# Patient Record
Sex: FEMALE | Race: WHITE | NOT HISPANIC OR LATINO | URBAN - METROPOLITAN AREA
[De-identification: names, ages, dates, MRNs, and addresses within clinical notes are randomized per-mention and may not be internally consistent; named-entity substitution may affect disease eponyms.]

---

## 2017-04-12 ENCOUNTER — IMPORTED ENCOUNTER (OUTPATIENT)
Dept: URBAN - METROPOLITAN AREA CLINIC 50 | Facility: CLINIC | Age: 82
End: 2017-04-12

## 2017-12-06 NOTE — PATIENT DISCUSSION
Use artificial tears to affected eye(s) as needed.  Rec try switching to pres. Free tears and can also try celluvisc.

## 2017-12-06 NOTE — PATIENT DISCUSSION
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 Macular pucker is usually due to previous posterior vitreous detachment but can also be due to uveitis, retinal vascular occlusion, retinal tear, retinal detachment, and idiopathic. Most patients with epiretinal membranes with macular pucker do not progress to the point where intervention is needed. Intervention is typically surgical. The patient can be observed carefully with retinal follow-up in a number of months. If the maculopathy progresses, surgery will be considered. 85% say the same.  Will watch closely.

## 2018-01-02 NOTE — PATIENT DISCUSSION
Discussed benefits, alternatives, realistic expectations and risks of surgery including (but not limited to) cataract, change in refraction, infection, bleeding, retinal detachment, optic neuropathy, loss of vision, blindness, and loss of eye.

## 2018-01-02 NOTE — PATIENT DISCUSSION
RD is present. Options from observation, surgery. pneumatic and second opinion discussed. Risks including not limited to infection, bleeding, glaucoma, cataract, IOL damage, PVR, retinal scars, multiple surgeries, chronic pain, cosmetic changes, decreased ability to perform daily activities causing personal/professional damage, loss of vision/eye discussed. Recommended surgery to slow the progression of RD and possibly resolve RD. The possibility of multiple surgeries emphasized.  Risks of infection, bleeding, tear, detachment, ocular irritations and cosmetic changes among others discussed. Thorough hygiene and antibiotic use discussed. Call for any changes. Required head positioned discussed.

## 2018-01-02 NOTE — PATIENT DISCUSSION
Patient understands condition, prognosis and need for follow up care.  No flying wth bubble.  Addis today 23G PPV/EL/PFO/AFG x change OD at [facility] today.

## 2018-01-03 NOTE — PATIENT DISCUSSION
Patient informed the pain was possibly due to the anesthesia wearing off last night.  Patient advised to call JTM's cell if she has anymore pain.

## 2018-01-09 NOTE — PATIENT DISCUSSION
Continue Vig QID until gone, PA QID, discontinue Atropine Sulfate, tobradex ointment as needed for pain from stitches.

## 2018-01-16 NOTE — PATIENT DISCUSSION
Taper schedule PA TID 1 week, BID 1 week, QDAY 1 week, then d/c (given another Rx with 1 refill) Tobradex or Erythromicin ointment, sample given as needed for pain from stitches.

## 2018-01-30 NOTE — PATIENT DISCUSSION
Resume normal positioning. NO FLAT ON BACK.  Nothing still real strenuous.  No lifting over 20lbs but light exercise okay.

## 2018-01-30 NOTE — PATIENT DISCUSSION
Poor vision with bubble in, this can cause HA's and depth perception to be off.  Discussed bubble at length, retina still healing and will take time to reach max vision potential.

## 2018-02-16 NOTE — PATIENT DISCUSSION
Good PO Progress, headache could be caused by gas bubble moving around, not because of eye pressure, which is within normal limits. Also reflections off gas bubble could be causing disturbance. Depth perception will still be off. Any position ok for now. Still no heavy lifting over 20lbs. Vision will never be what it was prior to detachment due to center vision area damaged from detachment. Advised pt to call with any s/s of RT/RD. Do not recc any exercise that jars the head.

## 2018-03-06 NOTE — PATIENT DISCUSSION
Recommended VITRECTOMY, endolaser, FLUID/GAS EXCHANGE. Discussed benefits, alternatives, and risks of surgery including (but not limited to) glaucoma, infection, bleeding, re-detachment, optic neuropathy, loss of vision, blindness, and loss of eye. Patient understands this surgery is to repair new retinal tear with detachment.

## 2018-03-08 NOTE — PATIENT DISCUSSION
Bubble will be at maximum size after 2 days, recommend wait for the continuation of laser until tomorrow afternoon.

## 2018-03-09 NOTE — PROCEDURE NOTE: CLINICAL
PROCEDURE NOTE: Repair of Retinal Detachment, 1 or More Sessions #1 OD. Diagnosis: Recurrent RD. Prior to laser, risks/benefits/alternatives to laser discussed including loss of vision, decreased peripheral and night vision, need for more laser and/or surgery and patient wished to proceed. A written consent is on file, and the need for today’s laser was discussed and the patient is understanding and wishes to proceed. Laser Lens: *. Wavelength: Argon Green. Spot size: * um. Pulse power: * mW. Number of pulses: *. Patient tolerated procedure well. There were no complications. Post-op instructions given. Patient given office phone number/answering service number and advised to call immediately should there be loss of vision or pain, or should they have any other questions or concerns. Rafael Olivares

## 2018-03-13 NOTE — PROCEDURE NOTE: CLINICAL
PROCEDURE NOTE: Repair of Retinal Detachment, 1 or More Sessions #2 OD. Diagnosis: Recurrent RD. Prior to laser, risks/benefits/alternatives to laser discussed including loss of vision, decreased peripheral and night vision, need for more laser and/or surgery and patient wished to proceed. A written consent is on file, and the need for today’s laser was discussed and the patient is understanding and wishes to proceed. Laser Lens: 3 mirror. Wavelength: Argon Green. Spot size: 200 um. Pulse power: 170 mW. Number of pulses: 530. Patient tolerated procedure well. There were no complications. Post-op instructions given. Patient given office phone number/answering service number and advised to call immediately should there be loss of vision or pain, or should they have any other questions or concerns. Chapo Oviedo

## 2018-03-23 NOTE — PATIENT DISCUSSION
When eye was dilated lens was pushed forward slightly, should push back into position once Cyclo d/c. May need additional surgery to reposition the lens. Will monitor for now.

## 2018-03-23 NOTE — PATIENT DISCUSSION
Pt leaving April 12th to [place]. If pt has any problems, JTM will refer patient to Retina specialist there. Ok to play golf.

## 2018-03-23 NOTE — PATIENT DISCUSSION
Good PO Progress. Well lasered, retina attached. No new RT/RD seen on exam. Retina needs time to heal. Recc dc'ing Cyclo and janusz Pred A to BID (PT did not taper last visit) for 1 week then QD for 1 week and follow up in 1 weeks.

## 2018-03-30 NOTE — PATIENT DISCUSSION
Right eye needs to be stabilized before possibly adding any laser to the left eye if needed. Will definitely add more laser before patient leaves for [facility].

## 2018-03-30 NOTE — PATIENT DISCUSSION
Pt edu retina attached. Recommend watching. S/S of RT/RD discussed in detail. Call with any vision changes.

## 2018-03-30 NOTE — PATIENT DISCUSSION
Pt edu dizziness more than likely due to the lens being pushed forward from the gas bubble and eyes are having trouble focusing together. Recommend restarting Cyclo BID and will need to see DWS to correct the placement on the lens. KUMAR will talk to [facility] personally.

## 2018-04-02 NOTE — PATIENT DISCUSSION
Pt educated retina attached. Recommend watching. S/S of RT/RD discussed in detail. Call with any vision changes.

## 2018-04-02 NOTE — PATIENT DISCUSSION
Right eye needs to be stabilized before possibly adding any laser to the left eye if needed. Will definitely add more laser before patient leaves for [facility].

## 2018-04-02 NOTE — PATIENT DISCUSSION
Patient informed that the iris was adhered to the anterior capsule which is pushing the IOL forward. Recommend proceeding with release of posterior synschiae and IOL reposition. Anticipate the lens moving back into place once this is done.

## 2018-04-06 NOTE — PATIENT DISCUSSION
Right eye needs to be stabilized before possibly adding any laser to the left eye if needed. Will definitely add more laser before patient leaves for [facility].

## 2018-04-06 NOTE — PATIENT DISCUSSION
Pred Forte 4 times a day for 1 week, 3 times a day for 1 week, then 2 times a day for 1 week then 1 time a day for 1 week then stop.

## 2018-04-10 NOTE — PATIENT DISCUSSION
Edu patient tears are well treated. Edu patient that there is some traction from when Pneumatic was done. Recommend watching closely for any S/S of RT/RD. Call with any vision changes.

## 2018-04-10 NOTE — PATIENT DISCUSSION
Stable at this point. Will still consider Prophy laser before leaving for [facility]. Will follow up in 1 week after patient returns from the [place].

## 2018-04-20 NOTE — PATIENT DISCUSSION
Rec Prophy laser before leaving for [facility]. #1 X 3-4 WEEKS to reinforce weak areas.  Alot of thinning or retina.

## 2018-04-20 NOTE — PATIENT DISCUSSION
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 This is often associated with myopic degeneration but can also be found in nonmyopic patients. Approximately 1% of patients with lattice degeneration will progress to symptomatic retinal detachment at some point. Most patients with lattice degeneration with or without atrophic holes can be observed without treatment. Indications for treatment include retinal detachment in the fellow eye, strong family history of retinal detachment, sudden onset of photopsias, open breaks with subretinal fluid, or extensive vitreal retinal traction. Most patients do not require intervention. The patient has risk factors for progression and will undergo laser photocoagulation. All of the findings were discussed in detail including the possibility of progressive retinal thinning/detachment, dilated pupil, photopsia, difficulty focusing and permanent vision loss with the patient. Retinal detachment warning signs were discussed.

## 2018-05-18 NOTE — PROCEDURE NOTE: CLINICAL
PROCEDURE NOTE: Laser for Lattice Degeneration #1 OS. Diagnosis: Lattice Degeneration of Retina. Prior to laser, risks/benefits/alternatives to laser discussed including loss of vision, decreased peripheral and night vision, need for more laser and/or surgery and patient wished to proceed. Spot size: 200 um. Power: 160 mW. Number: [id number]. Patient tolerated procedure well. There were no complications. Post procedure instructions given. Estephanie Pitts

## 2018-05-18 NOTE — PATIENT DISCUSSION
Due to RD, pt is having a hard time with monovision.  Discussed the brain is slowly adapting to the reattachment of the retina.  Discussed possible laser or IOL exchange in future to adjust to distance for OD.  Recommend being fit for CL first.  Schedule a refraction with Dr. Jasson Olivares for CL for distance.

## 2018-05-25 NOTE — PATIENT DISCUSSION
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  Ed simple solution at near is NVO OTC +2.25 I demo in room and this switches so that brain sees image at near with OS and pt feels NVA is excellent like this.  When I demo bilateral DVO pt does not like due to dissimilar images so rec DVO with OS only corrected to help with night driving glare and BCVA OS at night.

## 2018-06-05 NOTE — PATIENT DISCUSSION
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  Ed simple solution at near is NVO OTC +2.25 I demo in room and this switches so that brain sees image at near with OS and pt feels NVA is excellent like this.  When I demo bilateral DVO pt does not like due to dissimilar images so rec DVO with OS only corrected to help with night driving glare and BCVA OS at night.

## 2018-06-12 NOTE — PATIENT DISCUSSION
Pt edu good laser to Lattice. Looks nice and strong now. No new tears/breaks/detachments seen on exam today. Will need to monitor closely. S/S of RT/RD discussed in detail. Advised to call with any vision changes.

## 2018-06-12 NOTE — PATIENT DISCUSSION
Will continue to monitor. Eventually brain will adapt to vision but will be slow. Not causing any damage by not using CL. Will need to learn to adapt with CL. Again retina was detached so reason for decrease vision and distortion of vision. Had cell loss from detachment. Recommend Eye exercises with OD I.E Playing games on phone while covering up OS will make OD focus and will help strengthen.

## 2018-07-17 NOTE — PATIENT DISCUSSION
Pt stated she was kayaking yesterday and got sun screen in her eye. Thinks the name of the sunscreen was Eucerin and it may have been  per patient. This is not viral. Recommend using Thera Tears 1 gtts every hour until relief. Samples given to patient.

## 2018-07-17 NOTE — PATIENT DISCUSSION
Eventually brain will adapt to vision but will be slow. Not causing any damage by not using CL. Will need to learn to adapt with CL but up to patient if she wishes. Again retina was detached so reason for decrease vision and distortion of vision. Had cell loss from detachment. Will re-evaluate after patient returns from cruise.

## 2018-07-17 NOTE — PATIENT DISCUSSION
Good PO Progress, Retina attached and well healed. No new RT/RD seen on exam.  Ok to go on cruise to [facility]. Neuroadaptation can take 6m-1year. Blessing Douse will not have perfect vision due to retinal detachment. May always have some distortion. Advised pt to call with any vision changes.

## 2018-10-16 NOTE — PATIENT DISCUSSION
Again retina was detached so reason for decrease vision and distortion of vision. Had cell loss from detachment.

## 2018-10-16 NOTE — PATIENT DISCUSSION
Good PO Progress, Retina attached and well healed. No new RT/RD seen on exam.  Neuroadaptation can take 6m-1year. Bleen Hurtado will not have perfect vision due to retinal detachment. May always have some distortion. Advised pt to call with any vision changes. Pt also had dislocated IOL and re positioning, and recurrent RD so multiple surgeries to this eye and may have some after effects prolonged.  See #3.

## 2018-10-16 NOTE — PATIENT DISCUSSION
Pt still c/o significant diplopia and blur, now that retina has healed more refer back to [facility] to see if ?prisms may help.

## 2018-10-22 NOTE — PATIENT DISCUSSION
Good PO Progress, Retina attached and well healed. No new RT/RD seen on exam.  Neuroadaptation can take 6m-1year. Yohan Silver will not have perfect vision due to retinal detachment. May always have some distortion. Advised pt to call with any vision changes. Pt also had dislocated IOL and re positioning, and recurrent RD so multiple surgeries to this eye and may have some after effects prolonged.  See #3.

## 2018-10-22 NOTE — PATIENT DISCUSSION
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

## 2018-10-22 NOTE — PATIENT DISCUSSION
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  If i use Rx or prism to try to move or blur OD image at near pt still notices OD image unless I go to -12.00 OD and eyes are that dissociated.  Best option seems to be to use NVO to Rx OS and hang a patch on glasses OD for now.

## 2018-11-13 NOTE — PATIENT DISCUSSION
Pt edu might be something in environment that is causing the itching.  Explained that when she rubbed her eye she broke a small blood vessel.  Reassured that isolated subconjunctival hemorrhage does not correlate with intraocular bleeding or vision loss.

## 2018-11-13 NOTE — PATIENT DISCUSSION
Rx given for Bepreve BID for the itching.   Or if too expensive then can get OTC Zaditor to use BID.

## 2018-11-13 NOTE — PATIENT DISCUSSION
Good PO Progress, Retina attached and well healed. No new RT/RD seen on exam.  Neuroadaptation can take 6m-1year. Cheyenne Risk will not have perfect vision due to retinal detachment. May always have some distortion. Advised pt to call with any vision changes. Pt also had dislocated IOL and re positioning, and recurrent RD so multiple surgeries to this eye and may have some after effects prolonged.  See #3.

## 2019-03-26 NOTE — PATIENT DISCUSSION
Good PO Progress, Retina attached and well healed. No new RT/RD seen on exam.  Neuroadaptation can take 6m-1year. Margaux Raman will not have perfect vision due to retinal detachment. May always have some distortion. Advised pt to call with any vision changes. Pt also had dislocated IOL and re positioning, and recurrent RD so multiple surgeries to this eye and may have some after effects prolonged. **See #1, refer to [facility] for second opinion on Prism RX**.

## 2019-03-26 NOTE — PATIENT DISCUSSION
Pt still c/o diplopia and smaller images.  Structurally retina looks the same as last 2 visits, possible progressive interference.  Refer to Dr. Tommie Link for second opinion for prism RX.  ? May benefit from a weak SCL in one eye and ? weak new RX but as discussed in past pt has a recurrent RD and hx dislocated IOL and multiple eye surgeries so there will be some guarded vision progression. + R Hyper per [facility] notes.

## 2019-04-08 NOTE — PATIENT DISCUSSION
Good PO Progress, Retina attached and well healed. No new RT/RD seen on exam.  Neuroadaptation can take 6m-1year. José Miguel Espinoza will not have perfect vision due to retinal detachment. May always have some distortion. Advised pt to call with any vision changes. Pt also had dislocated IOL and re positioning, and recurrent RD so multiple surgeries to this eye and may have some after effects prolonged. **See #1, refer to [facility] for second opinion on Prism RX**.

## 2019-04-12 NOTE — PATIENT DISCUSSION
Good PO Progress, Retina attached and well healed. No new RT/RD seen on exam.  Neuroadaptation can take 6m-1year. Meena Beaver will not have perfect vision due to retinal detachment. May always have some distortion. Advised pt to call with any vision changes. Pt also had dislocated IOL and re positioning, and recurrent RD so multiple surgeries to this eye and may have some after effects prolonged. **See #1, refer to [facility] for second opinion on Prism RX**.

## 2019-06-03 NOTE — PATIENT DISCUSSION
Switch to thinner lens and not toric. RTC 1-2 weeks to see if improves. Continue AT's BID. Solutions may not be factor. Gave Biotrue. Feel it is more the thicker toric lens. OR shows she doesn't prefer the toric anyway. Go no lens in OS.

## 2019-06-03 NOTE — PATIENT DISCUSSION
Good PO Progress, Retina attached and well healed. No new RT/RD seen on exam.  Neuroadaptation can take 6m-1year. Neema Walter will not have perfect vision due to retinal detachment. May always have some distortion. Advised pt to call with any vision changes. Pt also had dislocated IOL and re positioning, and recurrent RD so multiple surgeries to this eye and may have some after effects prolonged. **See #1, refer to [facility] for second opinion on Prism RX**.

## 2019-06-18 NOTE — PATIENT DISCUSSION
Good PO Progress, Retina attached and well healed. No new RT/RD seen on exam.  Neuroadaptation can take 6m-1year. Lakia Hails will not have perfect vision due to retinal detachment. May always have some distortion. Advised pt to call with any vision changes. Pt also had dislocated IOL and re positioning, and recurrent RD so multiple surgeries to this eye and may have some after effects prolonged. **See #1, refer to [facility] for second opinion on Prism RX**.

## 2019-07-15 NOTE — PATIENT DISCUSSION
Doing well.
Educated patient how to monitor their peripheral vision and report any changes,.
Good PO Progress. Well lasered, retina attached. No new RT/RD seen on exam. Stop Moxifloxicin, decrease Pred Acetate to BID x 2 weeks and continue with Cyclopentolate BID until next appointment. Now that bubble is smaller would recommend adding additional laser to reinforce the retina, this will be a continuation. R & B discussed as prior. Pt elects to proceed.
Membrane is not visually significant.
Rec W/C QD, some plugging of oil glands causing irritation and A.T. use.
Recommended observation.
Retinal detachment warnings given.
See # 1.
See #1.
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 Macular pucker is usually due to previous posterior vitreous detachment but can also be due to uveitis, retinal vascular occlusion, retinal tear, retinal detachment, and idiopathic. Most patients with epiretinal membranes with macular pucker do not progress to the point where intervention is needed. Intervention is typically surgical. The patient can be observed carefully with retinal follow-up in a number of months. If the maculopathy progresses, surgery will be considered. 85% say the same.  Will watch closely.
This will resolve on it's own, could be from pt rubbing eye in sleep.
Use artificial tears to affected eye(s) as needed.
Use artificial tears to affected eye(s) as needed.  Rec try switching to pres. Free tears and can also try celluvisc.
No

## 2019-12-17 NOTE — PATIENT DISCUSSION
Pt edu good laser to Lattice. No new tears/breaks/detachments seen on exam today. Will need to monitor closely. S/S of RT/RD discussed in detail. Advised to call with any vision changes.

## 2021-03-11 NOTE — PATIENT DISCUSSION
Rec pt F/U with Dr Radha Niño due to new symptoms and signs of floater in OD with a hx of vitrectomy.

## 2021-03-23 NOTE — PATIENT DISCUSSION
Discussed a gas bubble will be placed during surgery.  Vision will not improve until gas bubble is gone.  No flying with gas bubble.  With gas bubble can sleep on either side, No FLAT ON BACK.

## 2021-03-23 NOTE — PATIENT DISCUSSION
Based on today’s exam, diagnostic studies, and review of records, and the patients functional difficulty which appear to be a result of the macular pucker, the determination was made for a vitrectomy with membrane peel. Discussed benefits, alternatives, and risks of surgery including (but not limited to) infection, bleeding, retinal detachment, optic neuropathy, loss of vision, blindness, and loss of eye. Patient was told the vision may not return to the same level as prior to development of the membrane but should improve as the anatomy returns to a more normal contour.  Discussed that the vision will slowly improve over the first 2 months and can take 6m-1 yr for BCVA.  Will plan 25g PPV/MP/EL/SF6.

## 2021-03-31 NOTE — PATIENT DISCUSSION
Reviewed positioning instructions in detail with the patient.  Face down while awake, sleep on right side.  NO FLAT ON BACK.  Reviewed no strenuous activity.  Reviewed eye protection while sleeping.

## 2021-04-06 NOTE — PROCEDURE NOTE: CLINICAL
PROCEDURE NOTE: A/C Paracentesis #1 OD. Diagnosis: Ocular Hypertension. Anesthesia: Proparacaine 0.5%. Prior to treatment, the risks/benefits/alternatives were discussed. The patient wished to proceed with procedure. Location of Tap: Temporal Limbus. The eye was prepped with topical Betadine 5%, a sterile lid speculum was placed in the eye. Volume of tap: 0.1 ml. Patient tolerated procedure well. There were no complications. Post procedure instructions given. Patient given office phone number/answering service number and advised to call immediately should there be an increase in floaters or redness, loss of vision or pain, or should they have any other questions or concerns. Mai Niño

## 2021-04-06 NOTE — PATIENT DISCUSSION
Pt will be traveling to [address] [place] [address] will check elevation and pt will likely have to take Oral medication as prophy. Pt denies any allergies.

## 2021-04-06 NOTE — PATIENT DISCUSSION
IOP above NL today. Recommend A/C para today. Pt elects to proceed. Completed without complication. Recommend starting Simbrinza 1gtt BID and Travatan 1gtt QHS, samples given and written instructions given to patient. 1 gtt of Simbrinza instilled while in office and had patient wait for IOP check. IOP following drop: 30. Recommend another A/C Para to lower IOP and remove bubble in A/C. Completed without complication and IOP: 20.

## 2021-04-06 NOTE — PATIENT DISCUSSION
Reviewed positioning instructions in detail with the patient.  Face down while awake for the next few days, sleep on right side.  NO FLAT ON BACK.  Reviewed no strenuous activity.  Reviewed eye protection while sleeping.

## 2021-04-06 NOTE — PATIENT DISCUSSION
Avoid exposure of the eye to non-sterile fluid such as shower/bath water. Ok to go to [employer] and [employer] but no submersion in water.

## 2021-04-13 NOTE — PATIENT DISCUSSION
IOP still above NL today,1 gtt of Simbrinza instilled while in office.  Pt states having a hard time getting gtts in, stressed importance. Gtts: Simbrinzia BID, Travatan QHS and start Diamox(Acetazolamide) 250mg tablets(samples dispensed for use on travel days) take 2 pills BID PO on days of travel-discussed in detail.

## 2021-04-13 NOTE — PATIENT DISCUSSION
PT TRAVELING, MUST TAKE 15 MIN BREAK FOR EVERY 500 FT OF ELEVATION.   patients travel route reviewed. Again no air travel while bubble is present.

## 2021-04-13 NOTE — PATIENT DISCUSSION
GTT: Stop Ofloxacin, cont Simbrinzia and Travatan as pt is taking and decrease PA to TID to start taper.  Diamox(Acetazolamide) 250mg tablets(samples dispensed for use on travel days) take 2 pills BID PO on days of travel-discussed in detail.

## 2021-04-13 NOTE — PATIENT DISCUSSION
Vision may improve gradually over the coming months, pt does have condensation on lens contributing to increased blur.

## 2021-04-13 NOTE — PATIENT DISCUSSION
Reviewed positioning instructions in detail with the patient.  Alternate sides at night but still NO FLAT ON BACK.  Reviewed no real strenuous activity, head jarring, or vigorous exercise.

## 2021-04-17 ASSESSMENT — VISUAL ACUITY
OS_PH: 20/40
OS_CC: 20/60
OD_CC: 20/40+

## 2021-04-17 ASSESSMENT — TONOMETRY
OD_IOP_MMHG: 14
OS_IOP_MMHG: 15

## 2021-04-20 NOTE — PATIENT DISCUSSION
GTT: Taper Prednisone to BID x1wk then Qday x1wk then stop. Continue with Travatan QHS and Simbrinza BID until gone.

## 2021-04-20 NOTE — PATIENT DISCUSSION
Reviewed positioning instructions in detail with the patient.  Alternate sides at night but still NO FLAT ON BACK. Kayaking, elliptical and stationary bike ok but nothing that jars they head. Avoid motor boats and golfing.

## 2021-04-20 NOTE — PATIENT DISCUSSION
Pt will be flying to [place] end of May, if gas bubble not gone will need to remove while in office.

## 2021-05-11 NOTE — PATIENT DISCUSSION
Problem: Patient Care Overview  Goal: Plan of Care Review  Outcome: Outcome(s) achieved Date Met: 09/05/19 09/05/19 1321   Coping/Psychosocial   Plan of Care Reviewed With patient   Plan of Care Review   Progress improving   OTHER   Outcome Summary OT eval completed. Pt is alert and oriented x4 with no complaints. pt was supervision for all functional mobility this date. Pt was conditional independent to supervision with grooming, full body bathing and full body dressing. pt has good balance and does not report increased fatigue or SOA. No skilled OT warranted at this time d/t independence level. OT to sign off. Recommended d/c home with assist. Pt hopes to d/c home this date.          Vitreous opacities are not visually significant.

## 2021-05-11 NOTE — PATIENT DISCUSSION
Vision may improve gradually over the coming months and again advised this is a slow healing process, Pt's symptoms are from the large gas bubble and pt. is seeing the edge of bubble.  No new RT/RD seen and IOP is WNL.  Multiple questions answered, pt will not be seeing well with bubble in and again needs to follow activity restrictions.  No air travel with bubble in.  Pt given info on Dr. Nayan Zavala for second opinion.

## 2021-05-11 NOTE — PATIENT DISCUSSION
Reviewed positioning instructions in detail with the patient.  Alternate sides at night but still NO FLAT ON BACK. Kayaking, elliptical and stationary bike ok but nothing that jars they head. Avoid motor boats and golfing.  At this point pt should still be taking it easy and mild acitvites due to large gas bubble still present.

## 2021-05-18 NOTE — PATIENT DISCUSSION
Vision may improve gradually over the coming months and again advised this is a slow healing process, Pt's symptoms are from the large gas bubble and pt. is seeing the edge of bubble.  No new RT/RD seen and IOP is WNL.  Multiple questions answered, pt will not be seeing well with bubble in and again needs to follow activity restrictions.  No air travel with bubble in.  Pt. states she saw Dr. Whit Abebe at [facility] for second opinion and confirmed pt is at proper healing stage.

## 2021-05-18 NOTE — PATIENT DISCUSSION
Cont. Fluorometholone . 1% and Simbrinza as pt is taking.  Pt stes having some nausea, samples of sublingual Zofran(ondansetron 4mg) given-use as directed on instructions.

## 2021-05-18 NOTE — PATIENT DISCUSSION
Reviewed positioning instructions in detail with the patient.  Alternate sides at night but still NO FLAT ON BACK. Kayaking, elliptical and stationary bike ok but nothing that jars they head.  NO treadmill. Avoid motor boats and golfing.  At this point pt should still be taking it easy and mild activity due to large gas bubble still present.  NO chiropractic or neck manipulation this has been edu x 2, massage okay but face down only absolutely NO flat on back and pt again stressed minimize head movement.

## 2021-06-02 NOTE — PATIENT DISCUSSION
Expect that vitreous opacities will resolve with time. well developed, well nourished , in no acute distress , ambulating without difficulty , normal communication ability

## 2021-06-04 NOTE — PATIENT DISCUSSION
Vision may improve gradually over the coming months and again advised this is a slow healing process, Pt's symptoms are from the large gas bubble and pt. is seeing the edge of bubble.  No new RT/RD seen and IOP is WNL.   pt will not be seeing well with bubble in and again needs to follow activity restrictions.  No air travel with bubble in.  Pt. states she saw Dr. Therese Bateman at [facility] for second opinion and confirmed pt is at proper healing stage.

## 2021-06-04 NOTE — PATIENT DISCUSSION
Further surgery may be needed in the future if recurrent RD/PVR develops or possible PPV for Opac/PFO.

## 2021-06-25 NOTE — PATIENT DISCUSSION
Reviewed positioning instructions in detail with the patient.  Alternate sides at night but still NO FLAT ON BACK. Kayaking, elliptical and stationary bike ok but nothing that jars they head.  NO treadmill. Avoid motor boats and golfing.  At this point pt should still be taking it easy and mild activity due to  gas bubble still present.  NO chiropractic or neck manipulation this has been edu x 2, massage okay but face down only absolutely NO flat on back and pt again stressed minimize head movement.  Light weights and tennis okay now but still not severe head jarring.

## 2021-06-25 NOTE — PATIENT DISCUSSION
Vision may improve gradually over the coming months and again advised this is a slow healing process.  No new RT/RD seen and IOP is WNL.    No air travel with bubble in.  Pt. states she saw Dr. Susan Wong at [address] [facility] [address] for second opinion and confirmed pt is at proper healing stage.

## 2021-06-25 NOTE — PATIENT DISCUSSION
Further surgery may be needed in the future if recurrent RD/PVR develops or possible PPV for Opac/PFO.  Will rec YAG laser for deposits on lens but need to wait until bubble is completely gone first, will check in 2-3 weeks.

## 2021-06-25 NOTE — PATIENT DISCUSSION
Will rec YAG laser for deposits on lens but need to wait until bubble is completely gone first, will check in 2-3 weeks in SRQ w/ possible tx.

## 2021-06-25 NOTE — PATIENT DISCUSSION
Cont. Fluorometholone . 1% and Simbrinza as taking-once simbrinza runs out pt will switch to Brimonidine, Dorzolamide BID due to OOP cost for [address]

## 2021-07-16 NOTE — PATIENT DISCUSSION
Further surgery may be needed in the future if recurrent RD/PVR develops or possible PPV for Opac/PFO.  Will rec YAG laser for deposits on lens in future ONLY if worsens and since pt's vision is improving every visit(today J1 in near eye).  YAG laser can cause recurrent inflammation or CME so would only want to do if worsens.

## 2021-07-16 NOTE — PATIENT DISCUSSION
Vision may improve gradually over the coming months and again advised this is a slow healing process.  No new RT/RD seen and IOP is WNL.  Pt. states she saw Dr. Iris Ortega at [address] [facility] for second opinion and confirmed pt is at proper healing stage.

## 2021-08-06 NOTE — PATIENT DISCUSSION
Vision may improve gradually over the coming months and again advised this is a slow healing process.  No new RT/RD seen and IOP is WNL.  Pt. states she saw Dr. Oleg Jones at [address] [facility] for second opinion and confirmed pt is at proper healing stage.

## 2021-08-06 NOTE — PATIENT DISCUSSION
decrease Fluorometholone . 1% to QOD  and cont as taking Brimonidine and stop Dorzolamide(possible allergic response).

## 2021-09-01 NOTE — PATIENT DISCUSSION
See #1.  dx discussed.  Edu a possible gas bubble will be placed during surgery.  Vision will not improve until gas bubble is gone.  No flying with gas bubble.  With gas bubble can sleep on either side, No FLAT ON BACK. Discussed that the vision will slowly improve over the first 2 months and can take 6m-1 yr for BCVA.  Will plan 25g PPV/MP/EL/ possible SF6/surgical capsulotomy.

## 2021-09-01 NOTE — PATIENT DISCUSSION
Vision may improve gradually over the coming months and again advised this is a slow healing process.  No new RT/RD seen and IOP is WNL.  Pt. states she saw Dr. Quynh Pretty at [facility] for second opinion and confirmed pt is at proper healing stage.

## 2021-09-01 NOTE — PATIENT DISCUSSION
Further surgery may be needed in the future if recurrent RD/PVR develops or possible PPV for Opac/PFO bubble/MP---(25g PPV/MP/EL/ possible SF6/surgical capsulotomy) will eval in October 2021 and will plan to schedule if still stable.

## 2021-10-01 NOTE — PATIENT DISCUSSION
A/C Tap performed in the office Today, Well tolerated, Discussed no swimming today and may discuss further surgery in the future.

## 2021-10-01 NOTE — PROCEDURE NOTE: CLINICAL
PROCEDURE NOTE: A/C Paracentesis OD. Diagnosis: S/P Vitreo-Retinal Surgery. Anesthesia: Proparacaine 0.5%. Prior to treatment, the risks/benefits/alternatives were discussed. The patient wished to proceed with procedure. Location of Tap: Temporal Limbus. The eye was prepped with topical Betadine 5%, a sterile lid speculum was placed in the eye. Volume of tap: 0.1 ml. Patient tolerated procedure well. There were no complications. Post procedure instructions given. Patient given office phone number/answering service number and advised to call immediately should there be an increase in floaters or redness, loss of vision or pain, or should they have any other questions or concerns. Thony Tabor

## 2021-10-01 NOTE — PATIENT DISCUSSION
No RT/RD seen on todays exam, Retinal tear and detachment warning symptoms reviewed and patient instructed to call immediately if increasing floaters, flashes, or decreasing peripheral vision.

## 2021-10-01 NOTE — PATIENT DISCUSSION
Vision may improve gradually over the coming months and again advised this is a slow healing process.  No new RT/RD seen and IOP is WNL.  Pt. states she saw Dr. Ermias Domingo at [facility] for second opinion and confirmed pt is at proper healing stage.

## 2021-10-08 NOTE — PATIENT DISCUSSION
See #2.  dx discussed.  Edu a possible gas bubble will be placed during surgery.  Vision will not improve until gas bubble is gone.  No flying with gas bubble.  With gas bubble can sleep on either side, No FLAT ON BACK. Discussed that the vision will slowly improve over the first 2 months and can take 6m-1 yr for BCVA.  Will plan 25g PPV/MP/EL/ possible SF6/surgical capsulotomy.

## 2021-10-08 NOTE — PATIENT DISCUSSION
As discussed at previous visit Further surgery may be needed in the future if recurrent RD/PVR develops or possible PPV for Opac/PFO bubble/MP---(25g PPV/MP/EL/ possible SF6/surgical capsulotomy). Todays exam shows improvement and spots will possibly go away on its own slowly will continue to monitor.

## 2021-10-08 NOTE — PATIENT DISCUSSION
Discussed w/patient that CBD gtts orally can sometimes cause IOP spikes. Recommend patient take CBD gtts 1 hour before next appt prior to checking IOP to see if any adverse reaction.

## 2021-10-08 NOTE — PATIENT DISCUSSION
possible role for future capsulotomy. Todays exam shows improvement and spots will possibly go away on its own slowly.  Will continue to monitor.

## 2021-10-08 NOTE — PATIENT DISCUSSION
Vision may improve gradually over the coming months and again advised this is a slow healing process.  No new RT/RD seen and IOP is WNL.  Pt. states she saw Dr. Susan Wong previously at [facility] for second opinion and confirmed pt is at proper healing stage.

## 2022-01-06 NOTE — PATIENT DISCUSSION
Vision may improve gradually over the coming months and again advised this is a slow healing process.  No new RT/RD seen and IOP is WNL.  Pt. states she saw Dr. Allen Brown previously at [facility] for second opinion and confirmed pt is at proper healing stage.

## 2022-01-24 NOTE — PATIENT DISCUSSION
Vision may improve gradually over the coming months and again advised this is a slow healing process.  No new RT/RD seen and IOP is WNL.  Pt. states she saw Dr. Jack Ko previously at [facility] for second opinion and confirmed pt is at proper healing stage.

## 2022-01-26 NOTE — PATIENT DISCUSSION
Vision may improve gradually over the coming months and again advised this is a slow healing process.  No new RT/RD seen and IOP is slightly elevated; continue brimonidine OD BID. Start maxitrol QID OD.

## 2022-01-26 NOTE — PATIENT DISCUSSION
Doing well, NO new TEARS/BREAKS/DETACHMENT seen on exam TODAY. Telephone visit completed  Note dictated  Labs/imaging ordered

## 2022-02-02 NOTE — PATIENT DISCUSSION
Vision may improve gradually over the coming months and again advised this is a slow healing process.  No new RT/RD seen and IOP is slightly elevated; continue brimonidine OD BID., add Cosopt PF BID OD. Start FML BID OD Discontinue maxitrol QID OD. Gave (2) zofran for pt to use prn for nausea.

## 2022-02-02 NOTE — PATIENT DISCUSSION
Advised to wait to drive until after the air bubble has dissolved; no sports while bubble is present.

## 2022-02-09 NOTE — PATIENT DISCUSSION
Advised to wait to drive until after the air bubble has dissolved; no sports while bubble is present. Only small amount of bubble remains. Advised patient that she will have no restrictions once the bubble is completely gone.

## 2022-02-09 NOTE — PATIENT DISCUSSION
Vision may improve gradually over the coming months and again advised this is a slow healing process.  No new RT/RD seen and IOP is WNL; continue brimonidine OD BID,  FML BID OD  Discontinue Cosopt PF BID OD.

## 2022-03-02 NOTE — PATIENT DISCUSSION
Vision may improve gradually over the coming months and again advised this is a slow healing process. Patient reports that her vision is blurry while looking down.  No new RT/RD seen and IOP is WNL; continue brimonidine OD BID,  FML BID OD.

## 2022-04-22 NOTE — PATIENT DISCUSSION
No treatment at this time. Observation recommended. No-Patient/Caregiver offered and refused free interpretation services.

## 2022-05-26 NOTE — PATIENT DISCUSSION
Vision may improve gradually over the coming months and again advised this is a slow healing process. Patient reports that her vision is blurry while looking straight down or straight up and to the side.  No new RT/RD seen and IOP is WNL; continue brimonidine OD BID,  FML BID OD.

## 2022-07-01 NOTE — PATIENT DISCUSSION
per JTM visit: Loreta Hernandez may improve gradually over the coming months and again advised this is a slow healing process. Patient reports that her vision is blurry while looking straight down or straight up and to the side.  No new RT/RD seen and IOP is WNL; continue brimonidine OD BID,  FML BID OD.

## 2022-07-01 NOTE — PATIENT DISCUSSION
"Chief Complaint   Patient presents with   • ALOC     pt had R hip replacement o tues ad discharged wed. family states pt has been acting \"different\" since. states she has been halluciatig and getting confused. pt is poor historian but thinks she hasnt taken any narcotics since she was discharged.     Pt BIB [facility] above complaints    /74   Pulse 94   Temp 37 °C (98.6 °F)   Resp 16   Ht 1.727 m (5' 8\")   Wt 98 kg (216 lb 0.8 oz)   SpO2 98%   BMI 32.85 kg/m²     " Patient educated to discontinue Cyclogyl and to start Pred Forte Bid OD.

## 2023-05-09 NOTE — PATIENT DISCUSSION
See #1. [facility] Dermatology Note  Encounter Date: May 9, 2023  Office Visit     Dermatology Problem List:    1. FBSE 5/9/2023  - History of liver transplant in August, 2018 on tacrolimus    2. Pruritus, chest    3. 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 Radiation dermatitis  - Right mid-back biopsy 7/6/2021  - ILK 10 5/17/2022  - Treating with clobetasol ointment 0.05%    5. Benign nevus, right medial cheek  - Biopsy done 11/2/2021    6. 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 Suspect that the ointment has not been penetrating the skin and recommended applying barrier to help it absorb better and prevent scratching    # Pruritus, chest; possibly related to a new topical item which may be causing irritation  - dermatographism test negative, no scaling on exam  - discussed trying antihistamine at night     # Seborrheic keratoses, inflamed  - chronic uncomplicated  - location: right temple, left shoulder; left back close to the midline  - number: 2  - benign nature reviewed     # History of liver transplant in 2018 on tacrolimus  - return in 1 year for FBSE  - sun protection: Counseled SPF30+ sunscreen, UPF clothing, sun avoidance, tanning bed avoidance.  - monitor: Patient to continue monitoring at home and will contact the clinic for any changes.    Procedures Performed:   - Cryotherapy procedure note, locations: left back x 1 and right clavicle x 1. After verbal consent and discussion of risks and benefits including, but not limited to, dyspigmentation/scar, blister, and pain, 2 x lesions were treated with 1-2 mm freeze border for 1-2 cycles with liquid nitrogen. Post cryotherapy  "instructions were provided.  - Procedure performed by faculty and medical student    Follow-up: 1 year in-person or earlier for new or changing lesions    Staff and Medical Student:   Jing Elias, MS1    Staff Physician:  I was present with the medical student who participated in the service and in the documentation of the note. I have verified the history and personally performed the physical exam and medical decision making. I agree with the assessment and plan of care as documented in the note.     Eldon Goetz MD  Staff Dermatologist and Dermatopathologist  , Department of Dermatology   ____________________________________________    CC: Skin Check (FBSC, spots of concern on back and R side of neck.)    HPI:  Ms. Sherrie Lala is a 66 year old female with a history of liver transplant in 2018 and radiation dermatitis on her back who presents today as a return patient for FBSE. She has recently had some issues with itchy skin, primarily on her chest in between her breasts. This started 2 weeks ago and is occasionally associated with a raised, blotchy rash which has now subsided. The itching is especially worse at night but she is able to sleep through it and does not wake her up. Over the 2 weeks she applied her prescribed clobetasol ointment on the area 3 times without much relief so she stopped trying it. During initial interview could not recall new changes to skin regimen but later recalled that she has been using a new body spray after showering recently. Additionally, she has multiple new skin tags on her clavicle and under her breasts. Also has an itchy SK on her back which is \"unsightly\" and is interested in getting it removed today.     Her back is still persistently thickened and itchy and needs to scratch it frequently using a back scratcher. Her  has been applying the clobetasol ointment on her back consistently every night for the past week but patient does not feel " like this helps much. Liver transplant course has been going well and she feels great. She uses sunscreen and a hat while she gardens outside. Patient is otherwise feeling well, without additional skin concerns.    Labs:  None reviewed.    Physical Exam:  Vitals: There were no vitals taken for this visit.  SKIN: Waist-up skin, which includes the head/face, neck, both arms, chest, back, abdomen, digits and/or nails was examined.  - Regular papule identified on the left proximal shoulder.   - Right scapula with scaly hypopigmented indurated plaque with irregular telangiectatic borders  - Central back: thin ovoid hypopigmented plaque  - Multiple waxy, stuck on appearing papules and plaques of face, arms, trunk; around shirt line there is erythematous base  - Previous site of skin cancer examined with no evidence of recurrence  - No other lesions of concern on areas examined    Medications:  Current Outpatient Medications   Medication     acetaminophen (TYLENOL) 500 MG tablet     amLODIPine (NORVASC) 5 MG tablet     amoxicillin (AMOXIL) 500 MG capsule     aspirin 81 MG EC tablet     gabapentin (NEURONTIN) 300 MG capsule     levothyroxine (SYNTHROID/LEVOTHROID) 125 MCG tablet     omeprazole 20 MG tablet     pramipexole (MIRAPEX) 0.25 MG tablet     tacrolimus (GENERIC EQUIVALENT) 1 MG capsule     zolpidem (AMBIEN) 10 MG tablet     albuterol (PROAIR HFA/PROVENTIL HFA/VENTOLIN HFA) 108 (90 Base) MCG/ACT inhaler     calcium carbonate 1250 MG/5ML SUSP suspension     calcium carbonate 600 mg-vitamin D 400 units (CALTRATE) 600-400 MG-UNIT per tablet     No current facility-administered medications for this visit.      Past Medical History:   Patient Active Problem List   Diagnosis     Age-related osteoporosis without current pathological fracture     Liver transplanted (H)     Immunosuppressed status (H)     Primary localized osteoarthritis of right knee     Malignant neoplasm of thyroid gland (H)     Chronic diarrhea      Health care maintenance     Past Medical History:   Diagnosis Date     Acute deep vein thrombosis (DVT) of right lower extremity (H) 04/02/2021     Antiplatelet or antithrombotic long-term use      Asthma      Bleeding esophageal varices (H) 03/06/2018     Cancer (H) 07/06/2021     Cholangiocarcinoma (H) 06/2014     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

## 2024-03-16 NOTE — PATIENT DISCUSSION
Rec W/C QD, some plugging of oil glands causing irritation and A.T. use. Spontaneous, unlabored and symmetrical

## 2024-06-19 NOTE — PATIENT DISCUSSION
GCS 15. Patient ambulatory to treatment area. Patient states that for a couple of months she has had RUQ abdominal pain with increased burping.  Patient states yesterday she had vomiting and diarrhea but has not had any episodes today. Patient states that she has had dark urine.   Use artificial tears to affected eye(s) as needed.

## 2024-08-19 NOTE — PATIENT DISCUSSION
Max pressure = 14 alfonso. Total duration = 10 seconds.     Max pressure = 14 alfonso. Total duration = 14 seconds.    Balloon reinflated a second time: Max pressure = 14 alfonso. Total duration = 14 seconds. Membrane is not visually significant.

## 2024-08-21 NOTE — PATIENT DISCUSSION
Reason for call:   [x] Refill   [] Prior Auth  [] Other:     Office:   [x] PCP/Provider -   [] Specialty/Provider -     Medication:    Losartan 100 mg tablet taken by mouth once daily #90 tabs     Jardiance 10 mg tablets taken by mouth once daily #90 tabs     Pharmacy: [facility] - [place] - [id number]  [phone]     Does the patient have enough for 3 days?   [] Yes   [x] No - Send as HP to POD     Use artificial tears to affected eye(s) as needed.  Rec try switching to pres. Free tears and can also try celluvisc.

## 2025-05-14 NOTE — PATIENT DISCUSSION
6/18/19 Improved. Continue with Biotrue and Oasys OD only.
Advised to call immediately if any worsening distortion or blurring is noted.
As discussed at previous visit Further surgery may be needed in the future if recurrent RD/PVR develops or possible PPV for Opac/PFO bubble/MP---(25g PPV/MP/EL/ possible SF6/surgical capsulotomy). Todays exam shows improvement and spots will possibly go away on its own slowly will continue to monitor.
Cont. Fluorometholone . 1% to QOD  and cont as taking Brimonidine BID.
Continue same CL.
Discussed that some residual distortion and blurred vision may persist even after successful epiretinal membrane peeling.
Discussed w/patient that CBD gtts orally can sometimes cause IOP spikes. Recommend patient take CBD gtts 1 hour before next appt prior to checking IOP to see if any adverse reaction.
Doing well, NO new TEARS/BREAKS/DETACHMENT seen on exam TODAY.
Doing well.
Expect that vitreous opacities will resolve with time.
Final visual acuity after complete healing is hard to predict at this point.
Importance of monitoring for signs of growth or change in lesion characteristics stressed.
Low risk for malignant transformation. Lesion appears to have been present for many years.
Membrane is not visually significant.
Monitor.
Nevus looks stable, no change from photos and/or exam. No fluid or orange Pigment.
No RT/RD seen on todays exam, Retinal tear and detachment warning symptoms reviewed and patient instructed to call immediately if increasing floaters, flashes, or decreasing peripheral vision.
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
No treatment at this time. Observation recommended.
Patient educated to discontinue Cyclogyl and to start Pred Forte Bid OD.
Patient elects observation.
Patient informed that the iris was adhered to the anterior capsule which is pushing the IOL forward. Recommend proceeding with release of posterior synschiae and IOL reposition. Anticipate the lens moving back into place once this is done.
Pt edu good laser to Lattice. No new tears/breaks/detachments seen on exam today. Will need to monitor closely. S/S of RT/RD discussed in detail. Advised to call with any vision changes.
Pt edu might be something in environment that is causing the itching.  Explained that when she rubbed her eye she broke a small blood vessel.  Reassured that isolated subconjunctival hemorrhage does not correlate with intraocular bleeding or vision loss.
Rec W/C QD, some plugging of oil glands causing irritation and A.T. use.
Recommended against surgery at this time given that patient is happy with present vision.
Recommended observation of subconjunctival hemorrhage with expected resolution over several weeks.
Recommended observation.
Retinal detachment warnings given.
Rx given for Bepreve BID for the itching.   Or if too expensive then can get OTC Zaditor to use BID.
See #1-2.
See #1.
Switch to thinner lens and not toric. RTC 1-2 weeks to see if improves. Continue AT's BID. Solutions may not be factor. Gave Biotrue. Feel it is more the thicker toric lens. OR shows she doesn't prefer the toric anyway. Go no lens in OS.
Use artificial tears to affected eye(s) as needed.
Use artificial tears to affected eye(s) as needed.  Rec try switching to pres. Free tears and can also try celluvisc.
Vision may improve gradually over the coming months and again advised this is a slow healing process.  No new RT/RD seen and IOP is WNL.  Pt. states she saw Dr. Steph Cuevas previously at [facility] for second opinion and confirmed pt is at proper healing stage.
Vitreous opacities are not visually significant.
Well lasered, retina attached. See # 1.
cpm's.
observe, not active today.
possible role for future capsulotomy. Todays exam shows improvement and spots will possibly go away on its own slowly.  Will continue to monitor.
retina attached no signs of endophthalmitis.
Accidental fall/Unanticipated physiological fall

## 2025-05-21 NOTE — PATIENT DISCUSSION
Vitreous opacities are not visually significant. What Type Of Note Output Would You Prefer (Optional)?: Bullet Format How Severe Is Your Skin Lesion?: moderate Has Your Skin Lesion Been Treated?: been treated Is This A New Presentation, Or A Follow-Up?: Skin Lesion